# Patient Record
Sex: FEMALE | Race: OTHER | Employment: PART TIME
[De-identification: names, ages, dates, MRNs, and addresses within clinical notes are randomized per-mention and may not be internally consistent; named-entity substitution may affect disease eponyms.]

---

## 2017-01-04 ENCOUNTER — ROUTINE PRENATAL (OUTPATIENT)
Dept: OBGYN | Facility: CLINIC | Age: 32
End: 2017-01-04

## 2017-01-04 VITALS
SYSTOLIC BLOOD PRESSURE: 138 MMHG | DIASTOLIC BLOOD PRESSURE: 91 MMHG | BODY MASS INDEX: 52.75 KG/M2 | WEIGHT: 293 LBS | HEART RATE: 106 BPM

## 2017-01-04 DIAGNOSIS — O09.91 HIGH-RISK PREGNANCY, FIRST TRIMESTER: Primary | ICD-10-CM

## 2017-01-04 PROCEDURE — 99212 OFFICE O/P EST SF 10 MIN: CPT | Performed by: OBSTETRICS & GYNECOLOGY

## 2017-01-10 ENCOUNTER — PATIENT MESSAGE (OUTPATIENT)
Dept: OBGYN | Facility: CLINIC | Age: 32
End: 2017-01-10

## 2017-01-11 ENCOUNTER — ROUTINE PRENATAL (OUTPATIENT)
Dept: OBGYN | Facility: CLINIC | Age: 32
End: 2017-01-11

## 2017-01-11 VITALS — BODY MASS INDEX: 53.25 KG/M2 | WEIGHT: 293 LBS

## 2017-01-11 DIAGNOSIS — O09.93 HIGH-RISK PREGNANCY, THIRD TRIMESTER: Primary | Chronic | ICD-10-CM

## 2017-01-11 PROCEDURE — 99212 OFFICE O/P EST SF 10 MIN: CPT | Performed by: OBSTETRICS & GYNECOLOGY

## 2017-01-13 ENCOUNTER — ROUTINE PRENATAL (OUTPATIENT)
Dept: PERINATAL CARE | Facility: CLINIC | Age: 32
End: 2017-01-13

## 2017-01-13 VITALS
DIASTOLIC BLOOD PRESSURE: 76 MMHG | WEIGHT: 293 LBS | RESPIRATION RATE: 20 BRPM | HEART RATE: 108 BPM | SYSTOLIC BLOOD PRESSURE: 128 MMHG | HEIGHT: 65 IN | TEMPERATURE: 97.6 F | BODY MASS INDEX: 48.82 KG/M2

## 2017-01-13 DIAGNOSIS — O99.810 ABNORMAL GLUCOSE TOLERANCE TEST (GTT) DURING PREGNANCY, ANTEPARTUM: ICD-10-CM

## 2017-01-13 DIAGNOSIS — O99.213 OBESITY COMPLICATING PREGNANCY, THIRD TRIMESTER: ICD-10-CM

## 2017-01-13 DIAGNOSIS — Z3A.36 36 WEEKS GESTATION OF PREGNANCY: ICD-10-CM

## 2017-01-13 DIAGNOSIS — Z03.74 SUSPECTED PROBLEM WITH FETAL GROWTH NOT FOUND: ICD-10-CM

## 2017-01-13 DIAGNOSIS — O09.293 HISTORY OF MACROSOMIA IN INFANT IN PRIOR PREGNANCY, CURRENTLY PREGNANT, THIRD TRIMESTER: ICD-10-CM

## 2017-01-13 DIAGNOSIS — O36.63X0 LARGE FOR DATES COMPLICATING PREGNANCY, ANTEPARTUM, THIRD TRIMESTER, NOT APPLICABLE OR UNSPECIFIED FETUS: Primary | ICD-10-CM

## 2017-01-13 DIAGNOSIS — Z13.89 ENCOUNTER FOR ROUTINE SCREENING FOR MALFORMATION USING ULTRASONICS: ICD-10-CM

## 2017-01-13 DIAGNOSIS — Z36.4 ANTENATAL SCREENING FOR FETAL GROWTH RETARDATION USING ULTRASONICS: ICD-10-CM

## 2017-01-13 PROBLEM — O09.299 HISTORY OF MACROSOMIA IN INFANT IN PRIOR PREGNANCY, CURRENTLY PREGNANT: Status: ACTIVE | Noted: 2017-01-13

## 2017-01-13 PROBLEM — O36.60X0 LARGE FOR DATES COMPLICATING PREGNANCY, ANTEPARTUM: Status: ACTIVE | Noted: 2017-01-13

## 2017-01-13 PROCEDURE — 76819 FETAL BIOPHYS PROFIL W/O NST: CPT | Performed by: OBSTETRICS & GYNECOLOGY

## 2017-01-13 PROCEDURE — 76816 OB US FOLLOW-UP PER FETUS: CPT | Performed by: OBSTETRICS & GYNECOLOGY

## 2017-01-17 ENCOUNTER — ROUTINE PRENATAL (OUTPATIENT)
Dept: OBGYN | Facility: CLINIC | Age: 32
End: 2017-01-17

## 2017-01-17 VITALS — BODY MASS INDEX: 53.58 KG/M2 | DIASTOLIC BLOOD PRESSURE: 80 MMHG | WEIGHT: 293 LBS | SYSTOLIC BLOOD PRESSURE: 125 MMHG

## 2017-01-17 DIAGNOSIS — O09.93 HIGH-RISK PREGNANCY, THIRD TRIMESTER: Primary | ICD-10-CM

## 2017-01-17 PROCEDURE — 99212 OFFICE O/P EST SF 10 MIN: CPT | Performed by: OBSTETRICS & GYNECOLOGY

## 2017-01-24 ENCOUNTER — TELEPHONE (OUTPATIENT)
Dept: OBGYN | Facility: CLINIC | Age: 32
End: 2017-01-24

## 2017-01-24 ENCOUNTER — ROUTINE PRENATAL (OUTPATIENT)
Dept: OBGYN | Facility: CLINIC | Age: 32
End: 2017-01-24

## 2017-01-24 VITALS
DIASTOLIC BLOOD PRESSURE: 80 MMHG | HEART RATE: 105 BPM | WEIGHT: 293 LBS | BODY MASS INDEX: 53.58 KG/M2 | SYSTOLIC BLOOD PRESSURE: 123 MMHG

## 2017-01-24 DIAGNOSIS — O09.93 HIGH-RISK PREGNANCY, THIRD TRIMESTER: Primary | ICD-10-CM

## 2017-01-24 PROCEDURE — 99212 OFFICE O/P EST SF 10 MIN: CPT | Performed by: OBSTETRICS & GYNECOLOGY

## 2017-01-27 PROBLEM — Z98.891 S/P C-SECTION: Status: ACTIVE | Noted: 2017-01-27

## 2017-02-02 ENCOUNTER — POSTPARTUM VISIT (OUTPATIENT)
Dept: OBGYN | Facility: CLINIC | Age: 32
End: 2017-02-02

## 2017-02-02 VITALS
TEMPERATURE: 98 F | RESPIRATION RATE: 15 BRPM | WEIGHT: 293 LBS | HEART RATE: 80 BPM | DIASTOLIC BLOOD PRESSURE: 82 MMHG | BODY MASS INDEX: 48.82 KG/M2 | SYSTOLIC BLOOD PRESSURE: 133 MMHG | OXYGEN SATURATION: 98 % | HEIGHT: 65 IN

## 2017-02-02 PROCEDURE — 99024 POSTOP FOLLOW-UP VISIT: CPT | Performed by: OBSTETRICS & GYNECOLOGY

## 2017-03-13 ENCOUNTER — PATIENT MESSAGE (OUTPATIENT)
Dept: OBGYN CLINIC | Age: 32
End: 2017-03-13

## 2017-03-15 ENCOUNTER — PATIENT MESSAGE (OUTPATIENT)
Dept: OBGYN CLINIC | Age: 32
End: 2017-03-15

## 2017-03-28 ENCOUNTER — ROUTINE PRENATAL (OUTPATIENT)
Dept: OBGYN CLINIC | Age: 32
End: 2017-03-28
Payer: MEDICARE

## 2017-03-28 VITALS
HEART RATE: 81 BPM | WEIGHT: 293 LBS | SYSTOLIC BLOOD PRESSURE: 126 MMHG | BODY MASS INDEX: 49.26 KG/M2 | DIASTOLIC BLOOD PRESSURE: 82 MMHG

## 2017-03-28 PROBLEM — Z03.74 SUSPECTED PROBLEM WITH FETAL GROWTH NOT FOUND: Status: RESOLVED | Noted: 2017-01-13 | Resolved: 2017-03-28

## 2017-03-28 PROBLEM — O36.60X0 LARGE FOR DATES COMPLICATING PREGNANCY, ANTEPARTUM: Status: RESOLVED | Noted: 2017-01-13 | Resolved: 2017-03-28

## 2017-04-21 ENCOUNTER — PATIENT MESSAGE (OUTPATIENT)
Dept: OBGYN | Facility: CLINIC | Age: 32
End: 2017-04-21

## 2017-04-24 ENCOUNTER — TELEPHONE (OUTPATIENT)
Dept: OBGYN CLINIC | Age: 32
End: 2017-04-24

## 2017-04-24 DIAGNOSIS — B37.0 THRUSH: Primary | ICD-10-CM

## 2017-04-24 RX ORDER — NYSTATIN 100000 U/G
CREAM TOPICAL
Qty: 1 TUBE | Refills: 1 | Status: SHIPPED | OUTPATIENT
Start: 2017-04-24 | End: 2017-05-10

## 2017-05-10 ENCOUNTER — OFFICE VISIT (OUTPATIENT)
Dept: OBGYN CLINIC | Age: 32
End: 2017-05-10
Payer: MEDICARE

## 2017-05-10 VITALS
WEIGHT: 293 LBS | DIASTOLIC BLOOD PRESSURE: 88 MMHG | BODY MASS INDEX: 50.09 KG/M2 | HEART RATE: 78 BPM | SYSTOLIC BLOOD PRESSURE: 132 MMHG

## 2017-05-10 DIAGNOSIS — L76.82 INCISIONAL PAIN: Primary | ICD-10-CM

## 2017-05-10 PROCEDURE — 99212 OFFICE O/P EST SF 10 MIN: CPT | Performed by: OBSTETRICS & GYNECOLOGY

## 2017-07-05 DIAGNOSIS — N92.6 MISSED MENSES: ICD-10-CM

## 2017-10-05 ENCOUNTER — HOSPITAL ENCOUNTER (OUTPATIENT)
Age: 32
Discharge: HOME | End: 2017-10-05
Payer: MEDICARE

## 2017-10-05 LAB
ABSOLUTE EOS #: 0.2 K/UL (ref 0–0.4)
ABSOLUTE LYMPH #: 2 K/UL (ref 1–4.8)
ABSOLUTE MONO #: 0.4 K/UL (ref 0.2–0.8)
ALBUMIN SERPL-MCNC: 4.1 G/DL (ref 3.5–5.2)
ALBUMIN/GLOBULIN RATIO: NORMAL (ref 1–2.5)
ALP BLD-CCNC: 96 U/L (ref 35–104)
ALT SERPL-CCNC: 10 U/L (ref 5–33)
ANION GAP SERPL CALCULATED.3IONS-SCNC: 11 MMOL/L (ref 9–17)
AST SERPL-CCNC: 13 U/L
BASOPHILS # BLD: 1 %
BASOPHILS ABSOLUTE: 0 K/UL (ref 0–0.2)
BILIRUB SERPL-MCNC: 0.42 MG/DL (ref 0.3–1.2)
BILIRUBIN DIRECT: 0.09 MG/DL
BUN BLDV-MCNC: 11 MG/DL (ref 6–20)
BUN/CREAT BLD: 19 (ref 9–20)
CALCIUM SERPL-MCNC: 9.2 MG/DL (ref 8.6–10.4)
CHLORIDE BLD-SCNC: 101 MMOL/L (ref 98–107)
CHOLESTEROL, FASTING: 175 MG/DL
CHOLESTEROL/HDL RATIO: 2.5
CO2: 29 MMOL/L (ref 20–31)
CREAT SERPL-MCNC: 0.59 MG/DL (ref 0.5–0.9)
DIFFERENTIAL TYPE: NORMAL
EOSINOPHILS RELATIVE PERCENT: 2 %
GFR AFRICAN AMERICAN: >60 ML/MIN
GFR NON-AFRICAN AMERICAN: >60 ML/MIN
GFR SERPL CREATININE-BSD FRML MDRD: NORMAL ML/MIN/{1.73_M2}
GFR SERPL CREATININE-BSD FRML MDRD: NORMAL ML/MIN/{1.73_M2}
GLUCOSE FASTING: 92 MG/DL (ref 70–99)
HCG QUANTITATIVE: <1 IU/L
HCT VFR BLD CALC: 43 % (ref 36–46)
HDLC SERPL-MCNC: 69 MG/DL
HEMOGLOBIN: 14.5 G/DL (ref 12–16)
LDL CHOLESTEROL: 91 MG/DL (ref 0–130)
LYMPHOCYTES # BLD: 28 %
MAGNESIUM: 1.9 MG/DL (ref 1.6–2.6)
MCH RBC QN AUTO: 29.1 PG (ref 26–34)
MCHC RBC AUTO-ENTMCNC: 33.8 G/DL (ref 31–37)
MCV RBC AUTO: 86.1 FL (ref 80–100)
MONOCYTES # BLD: 5 %
PDW BLD-RTO: 12.9 % (ref 11.5–14.5)
PLATELET # BLD: 330 K/UL (ref 130–400)
PLATELET ESTIMATE: NORMAL
PMV BLD AUTO: 6.8 FL (ref 6–12)
POTASSIUM SERPL-SCNC: 4.3 MMOL/L (ref 3.7–5.3)
RBC # BLD: 4.99 M/UL (ref 4–5.2)
RBC # BLD: NORMAL 10*6/UL
SEG NEUTROPHILS: 64 %
SEGMENTED NEUTROPHILS ABSOLUTE COUNT: 4.6 K/UL (ref 1.8–7.7)
SODIUM BLD-SCNC: 141 MMOL/L (ref 135–144)
T3 FREE: 2.97 PG/ML (ref 2.02–4.43)
THYROXINE, FREE: 1.1 NG/DL (ref 0.93–1.7)
TOTAL CK: 81 U/L (ref 26–192)
TOTAL PROTEIN: 7.2 G/DL (ref 6.4–8.3)
TRIGLYCERIDE, FASTING: 73 MG/DL
TSH SERPL DL<=0.05 MIU/L-ACNC: 2.5 MIU/L (ref 0.3–5)
VLDLC SERPL CALC-MCNC: NORMAL MG/DL (ref 1–30)
WBC # BLD: 7.2 K/UL (ref 3.5–11)
WBC # BLD: NORMAL 10*3/UL

## 2017-10-05 PROCEDURE — 84443 ASSAY THYROID STIM HORMONE: CPT

## 2017-10-05 PROCEDURE — 84702 CHORIONIC GONADOTROPIN TEST: CPT

## 2017-10-05 PROCEDURE — 84481 FREE ASSAY (FT-3): CPT

## 2017-10-05 PROCEDURE — 84439 ASSAY OF FREE THYROXINE: CPT

## 2017-10-05 PROCEDURE — 86376 MICROSOMAL ANTIBODY EACH: CPT

## 2017-10-05 PROCEDURE — 80061 LIPID PANEL: CPT

## 2017-10-05 PROCEDURE — 36415 COLL VENOUS BLD VENIPUNCTURE: CPT

## 2017-10-05 PROCEDURE — 80053 COMPREHEN METABOLIC PANEL: CPT

## 2017-10-05 PROCEDURE — 82248 BILIRUBIN DIRECT: CPT

## 2017-10-05 PROCEDURE — 86800 THYROGLOBULIN ANTIBODY: CPT

## 2017-10-05 PROCEDURE — 85025 COMPLETE CBC W/AUTO DIFF WBC: CPT

## 2017-10-05 PROCEDURE — 82550 ASSAY OF CK (CPK): CPT

## 2017-10-05 PROCEDURE — 83735 ASSAY OF MAGNESIUM: CPT

## 2017-10-06 LAB
THYROGLOBULIN AB: <20 IU/ML (ref 0–40)
THYROID PEROXIDASE (TPO) AB: <10 IU/ML (ref 0–35)

## 2017-11-01 ENCOUNTER — OFFICE VISIT (OUTPATIENT)
Dept: OBGYN CLINIC | Age: 32
End: 2017-11-01
Payer: MEDICARE

## 2017-11-01 VITALS
RESPIRATION RATE: 16 BRPM | DIASTOLIC BLOOD PRESSURE: 75 MMHG | SYSTOLIC BLOOD PRESSURE: 115 MMHG | WEIGHT: 293 LBS | HEIGHT: 65 IN | HEART RATE: 74 BPM | BODY MASS INDEX: 48.82 KG/M2 | OXYGEN SATURATION: 100 %

## 2017-11-01 DIAGNOSIS — Z30.2 ENCOUNTER FOR STERILIZATION: Primary | ICD-10-CM

## 2017-11-01 PROCEDURE — G8484 FLU IMMUNIZE NO ADMIN: HCPCS | Performed by: OBSTETRICS & GYNECOLOGY

## 2017-11-01 PROCEDURE — 1036F TOBACCO NON-USER: CPT | Performed by: OBSTETRICS & GYNECOLOGY

## 2017-11-01 PROCEDURE — G8427 DOCREV CUR MEDS BY ELIG CLIN: HCPCS | Performed by: OBSTETRICS & GYNECOLOGY

## 2017-11-01 PROCEDURE — G8417 CALC BMI ABV UP PARAM F/U: HCPCS | Performed by: OBSTETRICS & GYNECOLOGY

## 2017-11-01 PROCEDURE — 99212 OFFICE O/P EST SF 10 MIN: CPT | Performed by: OBSTETRICS & GYNECOLOGY

## 2017-11-01 RX ORDER — NORGESTIMATE AND ETHINYL ESTRADIOL 0.25-0.035
1 KIT ORAL DAILY
Qty: 1 PACKET | Refills: 6 | Status: SHIPPED | OUTPATIENT
Start: 2017-11-01 | End: 2020-09-15

## 2017-12-07 ENCOUNTER — TELEPHONE (OUTPATIENT)
Dept: OBGYN CLINIC | Age: 32
End: 2017-12-07

## 2018-10-12 ENCOUNTER — APPOINTMENT (OUTPATIENT)
Dept: GENERAL RADIOLOGY | Age: 33
End: 2018-10-12
Payer: MEDICAID

## 2018-10-12 ENCOUNTER — HOSPITAL ENCOUNTER (EMERGENCY)
Age: 33
Discharge: HOME | End: 2018-10-13
Attending: EMERGENCY MEDICINE
Payer: MEDICAID

## 2018-10-12 VITALS
HEART RATE: 94 BPM | SYSTOLIC BLOOD PRESSURE: 126 MMHG | WEIGHT: 293 LBS | RESPIRATION RATE: 25 BRPM | BODY MASS INDEX: 45.99 KG/M2 | OXYGEN SATURATION: 100 % | TEMPERATURE: 98.1 F | HEIGHT: 67 IN | DIASTOLIC BLOOD PRESSURE: 98 MMHG

## 2018-10-12 DIAGNOSIS — R07.9 CHEST PAIN, UNSPECIFIED TYPE: ICD-10-CM

## 2018-10-12 DIAGNOSIS — R42 LIGHTHEADEDNESS: Primary | ICD-10-CM

## 2018-10-12 LAB
ABSOLUTE EOS #: 0.19 K/UL (ref 0–0.44)
ABSOLUTE IMMATURE GRANULOCYTE: 0.03 K/UL (ref 0–0.3)
ABSOLUTE LYMPH #: 2.32 K/UL (ref 1.1–3.7)
ABSOLUTE MONO #: 0.59 K/UL (ref 0.1–1.2)
ANION GAP SERPL CALCULATED.3IONS-SCNC: 10 MMOL/L (ref 9–17)
BASOPHILS # BLD: 0 % (ref 0–2)
BASOPHILS ABSOLUTE: 0.04 K/UL (ref 0–0.2)
BUN BLDV-MCNC: 12 MG/DL (ref 6–20)
BUN/CREAT BLD: ABNORMAL (ref 9–20)
CALCIUM SERPL-MCNC: 9.1 MG/DL (ref 8.6–10.4)
CHLORIDE BLD-SCNC: 96 MMOL/L (ref 98–107)
CO2: 29 MMOL/L (ref 20–31)
CREAT SERPL-MCNC: 0.66 MG/DL (ref 0.5–0.9)
DIFFERENTIAL TYPE: ABNORMAL
EKG ATRIAL RATE: 84 BPM
EKG P AXIS: 46 DEGREES
EKG P-R INTERVAL: 164 MS
EKG Q-T INTERVAL: 354 MS
EKG QRS DURATION: 76 MS
EKG QTC CALCULATION (BAZETT): 418 MS
EKG R AXIS: 9 DEGREES
EKG T AXIS: 12 DEGREES
EKG VENTRICULAR RATE: 84 BPM
EOSINOPHILS RELATIVE PERCENT: 2 % (ref 1–4)
GFR AFRICAN AMERICAN: >60 ML/MIN
GFR NON-AFRICAN AMERICAN: >60 ML/MIN
GFR SERPL CREATININE-BSD FRML MDRD: ABNORMAL ML/MIN/{1.73_M2}
GFR SERPL CREATININE-BSD FRML MDRD: ABNORMAL ML/MIN/{1.73_M2}
GLUCOSE BLD-MCNC: 106 MG/DL (ref 70–99)
HCG QUALITATIVE: NEGATIVE
HCT VFR BLD CALC: 42.5 % (ref 36.3–47.1)
HEMOGLOBIN: 14 G/DL (ref 11.9–15.1)
IMMATURE GRANULOCYTES: 0 %
LYMPHOCYTES # BLD: 25 % (ref 24–43)
MCH RBC QN AUTO: 28.1 PG (ref 25.2–33.5)
MCHC RBC AUTO-ENTMCNC: 32.9 G/DL (ref 28.4–34.8)
MCV RBC AUTO: 85.2 FL (ref 82.6–102.9)
MONOCYTES # BLD: 6 % (ref 3–12)
NRBC AUTOMATED: 0 PER 100 WBC
PDW BLD-RTO: 12.1 % (ref 11.8–14.4)
PLATELET # BLD: 344 K/UL (ref 138–453)
PLATELET ESTIMATE: ABNORMAL
PMV BLD AUTO: 8.9 FL (ref 8.1–13.5)
POC TROPONIN I: 0.01 NG/ML (ref 0–0.1)
POC TROPONIN INTERP: NORMAL
POTASSIUM SERPL-SCNC: 4.8 MMOL/L (ref 3.7–5.3)
RBC # BLD: 4.99 M/UL (ref 3.95–5.11)
RBC # BLD: ABNORMAL 10*6/UL
SEG NEUTROPHILS: 67 % (ref 36–65)
SEGMENTED NEUTROPHILS ABSOLUTE COUNT: 6.19 K/UL (ref 1.5–8.1)
SODIUM BLD-SCNC: 135 MMOL/L (ref 135–144)
WBC # BLD: 9.4 K/UL (ref 3.5–11.3)
WBC # BLD: ABNORMAL 10*3/UL

## 2018-10-12 PROCEDURE — 85025 COMPLETE CBC W/AUTO DIFF WBC: CPT

## 2018-10-12 PROCEDURE — 99285 EMERGENCY DEPT VISIT HI MDM: CPT

## 2018-10-12 PROCEDURE — 2580000003 HC RX 258: Performed by: EMERGENCY MEDICINE

## 2018-10-12 PROCEDURE — 93005 ELECTROCARDIOGRAM TRACING: CPT

## 2018-10-12 PROCEDURE — 80048 BASIC METABOLIC PNL TOTAL CA: CPT

## 2018-10-12 PROCEDURE — 84703 CHORIONIC GONADOTROPIN ASSAY: CPT

## 2018-10-12 PROCEDURE — 84484 ASSAY OF TROPONIN QUANT: CPT

## 2018-10-12 PROCEDURE — 71046 X-RAY EXAM CHEST 2 VIEWS: CPT

## 2018-10-12 RX ORDER — 0.9 % SODIUM CHLORIDE 0.9 %
1000 INTRAVENOUS SOLUTION INTRAVENOUS ONCE
Status: COMPLETED | OUTPATIENT
Start: 2018-10-12 | End: 2018-10-13

## 2018-10-12 RX ORDER — ASPIRIN 81 MG/1
324 TABLET, CHEWABLE ORAL ONCE
Status: DISCONTINUED | OUTPATIENT
Start: 2018-10-12 | End: 2018-10-13 | Stop reason: HOSPADM

## 2018-10-12 RX ORDER — CLONAZEPAM 0.5 MG/1
0.5 TABLET ORAL 2 TIMES DAILY PRN
COMMUNITY
End: 2021-01-29

## 2018-10-12 RX ADMIN — SODIUM CHLORIDE 1000 ML: 9 INJECTION, SOLUTION INTRAVENOUS at 22:43

## 2018-10-12 ASSESSMENT — HEART SCORE: ECG: 0

## 2018-10-13 LAB
POC TROPONIN I: 0 NG/ML (ref 0–0.1)
POC TROPONIN INTERP: NORMAL

## 2018-10-13 PROCEDURE — 84484 ASSAY OF TROPONIN QUANT: CPT

## 2018-10-13 ASSESSMENT — ENCOUNTER SYMPTOMS
COLOR CHANGE: 0
ABDOMINAL PAIN: 0
SORE THROAT: 0
VOMITING: 0
NAUSEA: 0
EYE PAIN: 0
COUGH: 0
RHINORRHEA: 0
SHORTNESS OF BREATH: 0

## 2018-10-13 NOTE — ED PROVIDER NOTES
ACS, PE. Patient is a heart score of 2 and his negative PERC criteria. Given her presentation, we'll plan to perform a syncopal/cardiac workup. We'll give her some IV fluids and reassess. RADIOLOGY:  Xr Chest Standard (2 Vw)    Result Date: 10/12/2018  EXAMINATION: TWO VIEWS OF THE CHEST 10/12/2018 9:59 pm COMPARISON: 02/26/2015 HISTORY: ORDERING SYSTEM PROVIDED HISTORY: chest pain TECHNOLOGIST PROVIDED HISTORY: chest pain FINDINGS: Cardiomediastinal contour is within normal limits. No focal consolidation. No significant pleural effusion. 1. No acute cardiopulmonary disease. EKG  Normal sinus rhythm with a ventricular rate of 84 bpm.  Axis is normal.  Intervals within normal limits. No acute ST segment changes. There is T-wave inversion in lead 3, but all these findings are consistent with an EKG Done on 10/26/16. Impression: Nonspecific EKG. All EKG's are interpreted by the Emergency Department Physician who either signs or Co-signs this chart in the absence of a cardiologist.    EMERGENCY DEPARTMENT COURSE:  Upon reassessment, patient stated that she felt much improved. Denies any lightheadedness or chest discomfort. She is also updated on lab work and imaging studies. Patient was advised to follow-up with her PCP. Discussed that if symptoms persist or worsen, she should return to the ED. Patient demonstrated her understanding and is agreeable with the plan. She is stable for discharge. PROCEDURES:  None    CONSULTS:  None    CRITICAL CARE:  None    FINAL IMPRESSION      1. Lightheadedness    2.  Chest pain, unspecified type          DISPOSITION / PLAN     DISPOSITION Decision To Discharge 10/13/2018 12:23:19 AM      PATIENT REFERRED TO:  Yousif Owens MD  [address] [place] [address]  [phone]    Schedule an appointment as soon as possible for a visit   As needed, If symptoms worsen      DISCHARGE MEDICATIONS:  Discharge Medication List as of 10/13/2018 12:23 AM Shira Hardy MD  Emergency Medicine Resident    (Please note that portions of thisnote were completed with a voice recognition program.  Efforts were made to edit the dictations but occasionally words are mis-transcribed.)       Shira Hardy MD  Resident  10/13/18 [id number]

## 2018-10-13 NOTE — ED NOTES
Pt and family updated on POC, fluids started, denies further needs.      Juan Mcmillan, GARRISON  10/12/18 [id number]

## 2019-12-25 ENCOUNTER — HOSPITAL ENCOUNTER (EMERGENCY)
Age: 34
Discharge: HOME | End: 2019-12-25
Attending: EMERGENCY MEDICINE
Payer: MEDICARE

## 2019-12-25 ENCOUNTER — APPOINTMENT (OUTPATIENT)
Dept: GENERAL RADIOLOGY | Age: 34
End: 2019-12-25
Payer: MEDICARE

## 2019-12-25 VITALS
WEIGHT: 293 LBS | DIASTOLIC BLOOD PRESSURE: 86 MMHG | OXYGEN SATURATION: 100 % | BODY MASS INDEX: 53.25 KG/M2 | TEMPERATURE: 97.8 F | SYSTOLIC BLOOD PRESSURE: 123 MMHG | RESPIRATION RATE: 17 BRPM | HEART RATE: 79 BPM

## 2019-12-25 DIAGNOSIS — R00.2 PALPITATIONS: Primary | ICD-10-CM

## 2019-12-25 LAB
ABSOLUTE EOS #: 0.14 K/UL (ref 0–0.44)
ABSOLUTE IMMATURE GRANULOCYTE: <0.03 K/UL (ref 0–0.3)
ABSOLUTE LYMPH #: 3.41 K/UL (ref 1.1–3.7)
ABSOLUTE MONO #: 0.58 K/UL (ref 0.1–1.2)
ANION GAP SERPL CALCULATED.3IONS-SCNC: 13 MMOL/L (ref 9–17)
BASOPHILS # BLD: 1 % (ref 0–2)
BASOPHILS ABSOLUTE: 0.06 K/UL (ref 0–0.2)
BUN BLDV-MCNC: 11 MG/DL (ref 6–20)
BUN/CREAT BLD: ABNORMAL (ref 9–20)
CALCIUM IONIZED: 1.15 MMOL/L (ref 1.13–1.33)
CALCIUM SERPL-MCNC: 9.2 MG/DL (ref 8.6–10.4)
CHLORIDE BLD-SCNC: 98 MMOL/L (ref 98–107)
CO2: 24 MMOL/L (ref 20–31)
CREAT SERPL-MCNC: 0.6 MG/DL (ref 0.5–0.9)
D-DIMER QUANTITATIVE: 0.31 MG/L FEU
DIFFERENTIAL TYPE: ABNORMAL
EOSINOPHILS RELATIVE PERCENT: 1 % (ref 1–4)
GFR AFRICAN AMERICAN: >60 ML/MIN
GFR NON-AFRICAN AMERICAN: >60 ML/MIN
GFR SERPL CREATININE-BSD FRML MDRD: ABNORMAL ML/MIN/{1.73_M2}
GFR SERPL CREATININE-BSD FRML MDRD: ABNORMAL ML/MIN/{1.73_M2}
GLUCOSE BLD-MCNC: 115 MG/DL (ref 70–99)
HCG QUALITATIVE: NEGATIVE
HCT VFR BLD CALC: 45.4 % (ref 36.3–47.1)
HEMOGLOBIN: 14.5 G/DL (ref 11.9–15.1)
IMMATURE GRANULOCYTES: 0 %
LYMPHOCYTES # BLD: 35 % (ref 24–43)
MAGNESIUM: 2 MG/DL (ref 1.6–2.6)
MCH RBC QN AUTO: 27.9 PG (ref 25.2–33.5)
MCHC RBC AUTO-ENTMCNC: 31.9 G/DL (ref 28.4–34.8)
MCV RBC AUTO: 87.5 FL (ref 82.6–102.9)
MONOCYTES # BLD: 6 % (ref 3–12)
NRBC AUTOMATED: 0 PER 100 WBC
PDW BLD-RTO: 12.5 % (ref 11.8–14.4)
PLATELET # BLD: 351 K/UL (ref 138–453)
PLATELET ESTIMATE: ABNORMAL
PMV BLD AUTO: 8.9 FL (ref 8.1–13.5)
POTASSIUM SERPL-SCNC: 3.8 MMOL/L (ref 3.7–5.3)
RBC # BLD: 5.19 M/UL (ref 3.95–5.11)
RBC # BLD: ABNORMAL 10*6/UL
SEG NEUTROPHILS: 57 % (ref 36–65)
SEGMENTED NEUTROPHILS ABSOLUTE COUNT: 5.61 K/UL (ref 1.5–8.1)
SODIUM BLD-SCNC: 135 MMOL/L (ref 135–144)
THYROXINE, FREE: 1.32 NG/DL (ref 0.93–1.7)
TROPONIN INTERP: NORMAL
TROPONIN T: NORMAL NG/ML
TROPONIN, HIGH SENSITIVITY: <6 NG/L (ref 0–14)
TSH SERPL DL<=0.05 MIU/L-ACNC: 5.17 MIU/L (ref 0.3–5)
WBC # BLD: 9.8 K/UL (ref 3.5–11.3)
WBC # BLD: ABNORMAL 10*3/UL

## 2019-12-25 PROCEDURE — 99285 EMERGENCY DEPT VISIT HI MDM: CPT

## 2019-12-25 PROCEDURE — 84439 ASSAY OF FREE THYROXINE: CPT

## 2019-12-25 PROCEDURE — 71045 X-RAY EXAM CHEST 1 VIEW: CPT

## 2019-12-25 PROCEDURE — 80048 BASIC METABOLIC PNL TOTAL CA: CPT

## 2019-12-25 PROCEDURE — 85025 COMPLETE CBC W/AUTO DIFF WBC: CPT

## 2019-12-25 PROCEDURE — 85379 FIBRIN DEGRADATION QUANT: CPT

## 2019-12-25 PROCEDURE — 84484 ASSAY OF TROPONIN QUANT: CPT

## 2019-12-25 PROCEDURE — 84443 ASSAY THYROID STIM HORMONE: CPT

## 2019-12-25 PROCEDURE — 93005 ELECTROCARDIOGRAM TRACING: CPT | Performed by: STUDENT IN AN ORGANIZED HEALTH CARE EDUCATION/TRAINING PROGRAM

## 2019-12-25 PROCEDURE — 82330 ASSAY OF CALCIUM: CPT

## 2019-12-25 PROCEDURE — 84703 CHORIONIC GONADOTROPIN ASSAY: CPT

## 2019-12-25 PROCEDURE — 2580000003 HC RX 258: Performed by: STUDENT IN AN ORGANIZED HEALTH CARE EDUCATION/TRAINING PROGRAM

## 2019-12-25 PROCEDURE — 83735 ASSAY OF MAGNESIUM: CPT

## 2019-12-25 RX ORDER — 0.9 % SODIUM CHLORIDE 0.9 %
1000 INTRAVENOUS SOLUTION INTRAVENOUS ONCE
Status: COMPLETED | OUTPATIENT
Start: 2019-12-25 | End: 2019-12-25

## 2019-12-25 RX ADMIN — SODIUM CHLORIDE 1000 ML: 9 INJECTION, SOLUTION INTRAVENOUS at 03:02

## 2019-12-26 LAB
EKG ATRIAL RATE: 89 BPM
EKG P AXIS: 26 DEGREES
EKG P-R INTERVAL: 156 MS
EKG Q-T INTERVAL: 368 MS
EKG QRS DURATION: 78 MS
EKG QTC CALCULATION (BAZETT): 447 MS
EKG R AXIS: -11 DEGREES
EKG T AXIS: -5 DEGREES
EKG VENTRICULAR RATE: 89 BPM

## 2019-12-26 PROCEDURE — 93010 ELECTROCARDIOGRAM REPORT: CPT | Performed by: INTERNAL MEDICINE

## 2020-09-11 ENCOUNTER — APPOINTMENT (OUTPATIENT)
Dept: CT IMAGING | Age: 35
End: 2020-09-11
Payer: COMMERCIAL

## 2020-09-11 ENCOUNTER — HOSPITAL ENCOUNTER (EMERGENCY)
Age: 35
Discharge: HOME | End: 2020-09-11
Attending: EMERGENCY MEDICINE
Payer: COMMERCIAL

## 2020-09-11 VITALS
SYSTOLIC BLOOD PRESSURE: 143 MMHG | WEIGHT: 293 LBS | HEIGHT: 65 IN | RESPIRATION RATE: 12 BRPM | OXYGEN SATURATION: 98 % | HEART RATE: 80 BPM | DIASTOLIC BLOOD PRESSURE: 95 MMHG | BODY MASS INDEX: 48.82 KG/M2 | TEMPERATURE: 97.3 F

## 2020-09-11 LAB
ANION GAP SERPL CALCULATED.3IONS-SCNC: 9 MMOL/L (ref 9–17)
BUN BLDV-MCNC: 10 MG/DL (ref 6–20)
BUN/CREAT BLD: ABNORMAL (ref 9–20)
CALCIUM SERPL-MCNC: 9.3 MG/DL (ref 8.6–10.4)
CHLORIDE BLD-SCNC: 99 MMOL/L (ref 98–107)
CHP ED QC CHECK: YES
CO2: 28 MMOL/L (ref 20–31)
CREAT SERPL-MCNC: 0.65 MG/DL (ref 0.5–0.9)
GFR AFRICAN AMERICAN: >60 ML/MIN
GFR NON-AFRICAN AMERICAN: >60 ML/MIN
GFR SERPL CREATININE-BSD FRML MDRD: ABNORMAL ML/MIN/{1.73_M2}
GFR SERPL CREATININE-BSD FRML MDRD: ABNORMAL ML/MIN/{1.73_M2}
GLUCOSE BLD-MCNC: 114 MG/DL (ref 70–99)
HCG, PREGNANCY URINE (POC): NEGATIVE
POTASSIUM SERPL-SCNC: 4 MMOL/L (ref 3.7–5.3)
PREGNANCY TEST URINE, POC: NEGATIVE
SODIUM BLD-SCNC: 136 MMOL/L (ref 135–144)

## 2020-09-11 PROCEDURE — 6360000002 HC RX W HCPCS: Performed by: GENERAL PRACTICE

## 2020-09-11 PROCEDURE — 81025 URINE PREGNANCY TEST: CPT

## 2020-09-11 PROCEDURE — 6370000000 HC RX 637 (ALT 250 FOR IP): Performed by: GENERAL PRACTICE

## 2020-09-11 PROCEDURE — 70496 CT ANGIOGRAPHY HEAD: CPT

## 2020-09-11 PROCEDURE — 96372 THER/PROPH/DIAG INJ SC/IM: CPT

## 2020-09-11 PROCEDURE — 80048 BASIC METABOLIC PNL TOTAL CA: CPT

## 2020-09-11 PROCEDURE — 6360000004 HC RX CONTRAST MEDICATION: Performed by: GENERAL PRACTICE

## 2020-09-11 PROCEDURE — 70450 CT HEAD/BRAIN W/O DYE: CPT

## 2020-09-11 PROCEDURE — 99284 EMERGENCY DEPT VISIT MOD MDM: CPT

## 2020-09-11 RX ORDER — PROCHLORPERAZINE EDISYLATE 5 MG/ML
10 INJECTION INTRAMUSCULAR; INTRAVENOUS ONCE
Status: DISCONTINUED | OUTPATIENT
Start: 2020-09-11 | End: 2020-09-11

## 2020-09-11 RX ORDER — KETOROLAC TROMETHAMINE 30 MG/ML
30 INJECTION, SOLUTION INTRAMUSCULAR; INTRAVENOUS ONCE
Status: DISCONTINUED | OUTPATIENT
Start: 2020-09-11 | End: 2020-09-11

## 2020-09-11 RX ORDER — PROCHLORPERAZINE MALEATE 10 MG
10 TABLET ORAL ONCE
Status: COMPLETED | OUTPATIENT
Start: 2020-09-11 | End: 2020-09-11

## 2020-09-11 RX ORDER — ORPHENADRINE CITRATE 30 MG/ML
60 INJECTION INTRAMUSCULAR; INTRAVENOUS ONCE
Status: COMPLETED | OUTPATIENT
Start: 2020-09-11 | End: 2020-09-11

## 2020-09-11 RX ORDER — DIPHENHYDRAMINE HCL 25 MG
25 TABLET ORAL ONCE
Status: COMPLETED | OUTPATIENT
Start: 2020-09-11 | End: 2020-09-11

## 2020-09-11 RX ADMIN — ORPHENADRINE CITRATE 60 MG: 30 INJECTION INTRAMUSCULAR; INTRAVENOUS at 10:56

## 2020-09-11 RX ADMIN — IOHEXOL 90 ML: 350 INJECTION, SOLUTION INTRAVENOUS at 12:28

## 2020-09-11 RX ADMIN — PROCHLORPERAZINE MALEATE 10 MG: 10 TABLET ORAL at 11:06

## 2020-09-11 RX ADMIN — DIPHENHYDRAMINE HCL 25 MG: 25 TABLET ORAL at 11:06

## 2020-09-11 ASSESSMENT — ENCOUNTER SYMPTOMS
ABDOMINAL PAIN: 0
EYE REDNESS: 0
COUGH: 0
PHOTOPHOBIA: 0
NAUSEA: 0
EYE DISCHARGE: 0
EYE PAIN: 0
BACK PAIN: 0
SHORTNESS OF BREATH: 0
EYE ITCHING: 0
VOMITING: 0

## 2020-09-11 ASSESSMENT — VISUAL ACUITY
OS: 20/40
OD: 20/40
OU: 20/30

## 2020-09-11 ASSESSMENT — PAIN SCALES - GENERAL: PAINLEVEL_OUTOF10: 5

## 2020-09-11 ASSESSMENT — PAIN DESCRIPTION - PAIN TYPE: TYPE: ACUTE PAIN

## 2020-09-11 ASSESSMENT — PAIN DESCRIPTION - LOCATION: LOCATION: HEAD;NECK

## 2020-09-11 NOTE — ED NOTES
Bed: 07  Expected date:   Expected time:   Means of arrival:   Comments:     Ana Cristina Lynch RN  09/11/20 [id number]

## 2020-09-11 NOTE — ED NOTES
Wanda Aquino, EMT-P at bedside for IV placement and lab work     Barry Pike, [address] [place] [address]  09/11/20 [id number]

## 2020-09-11 NOTE — ED PROVIDER NOTES
[facility]  Emergency Department  Faculty Attestation     I performed a history and physical examination of the patient and discussed management with the resident. I reviewed the residents note and agree with the documented findings and plan of care. Any areas of disagreement are noted on the chart. I was personally present for the key portions of any procedures. I have documented in the chart those procedures where I was not present during the key portions. I have reviewed the emergency nurses triage note. I agree with the chief complaint, past medical history, past surgical history, allergies, medications, social and family history as documented unless otherwise noted below. For Physician Assistant/ Nurse Practitioner cases/documentation I have personally evaluated this patient and have completed at least one if not all key elements of the E/M (history, physical exam, and MDM). Additional findings are as noted. Primary Care Physician:  Leah Conrad MD    Screenings:  [unfilled]    CHIEF COMPLAINT       Chief Complaint   Patient presents with    Headache     c/o posterior headache since last night stating radiates down her neck w/ intermittent blurred vision to right eye, states she took tylenol 1500mg 2 hours pta       RECENT VITALS:   Temp: 97.3 °F (36.3 °C),  Pulse: 80, Resp: 12, BP: (!) 143/95    LABS:  Labs Reviewed   POCT URINE PREGNANCY       Radiology  No orders to display       Attending Physician Additional  Notes    Patient has severe unusual left-sided headache that starts behind her left ear/occiput and radiates up to the top of her head. The pain makes her nauseated but there is no vomiting. She has blurring of her right eye vision. No scintillating scotoma or flashes. No curtain. No stiff neck other than she has difficulty turning to the left. No fever chills or sweats. No covert symptoms. No family history of aneurysm or migraine.   No history of concussion or strangulation. She is not known to be pregnant at this time. No focal strokelike deficits, other than dizziness when she moves, felt she was off balance with walking. On exam she is uncomfortable but nontoxic slightly hypertensive afebrile vital signs otherwise normal, GCS 15. Normal speech mentation memory pupils extraocular movements. Cranial nerves intact. Normal motor strength. Normal gait. Normal finger-nose movements. Neck is supple. There is small lymphadenopathy in the left suboccipital chain. There is no occipital nerve tenderness. Impression is unusual headache, consider vertebral artery dissection. Plan is IV access, pregnancy test, Compazine Benadryl, CT brain, CT angiogram.            Patricia .  Romain Michel MD, [facility]  Attending Emergency  Physician               Michaeline Sicard, MD  09/11/20 [phone]

## 2020-09-11 NOTE — ED PROVIDER NOTES
[facility] ED  Emergency Department Encounter  EmergencyMedicine Resident     Pt Name:Teagan Cohen  MRN: [id number]  [place] 1985  Date of evaluation: 20  PCP:  [place] MD    CHIEF COMPLAINT       Chief Complaint   Patient presents with    Headache     c/o posterior headache since last night stating radiates down her neck w/ intermittent blurred vision to right eye, states she took tylenol 1500mg 2 hours pta       HISTORY OF PRESENT ILLNESS  (Location/Symptom, Timing/Onset, Context/Setting, Quality, Duration, Modifying Factors, Severity.)      Jay Suárez is a 37 y.o. female who presents with left-sided posterior headache that starts in her neck and radiates into the occipital region of the left side of her head, does have occasional blurry vision in left eye with the symptoms. Patient states she has seen her primary care provider for this and was given muscle relaxers which did not help. Patient states she did take a Tylenol 2 hours prior to arrival and her symptoms have resolved. Patient states she feels like she can feel a knot in the neck and it hurts when she touches it. Patient states her symptoms are sometimes worse after waking up, but normally get better throughout the day. Denies any history of migraines, photophobia, bright flashing lights or any current vision changes. Patient states she does have occasional nausea associated with the pain but denies any vomiting, chest pain, shortness of breath, abdominal pain. Patient's last menstrual period was 3 weeks ago which is normal for her. PAST MEDICAL / SURGICAL / SOCIAL / FAMILY HISTORY      has a past medical history of Abnormal Pap smear, Allergic rhinitis, Anxiety, BMI 50.0-59.9, adult ([address] Depression, Obesity, and Ureteral stone with hydronephrosis. has a past surgical history that includes  section (2007, 16); Dilation and curettage of uterus;  Cystocopy (16); other surgical history (Left, 16); and Lithotripsy. Social History     Socioeconomic History    Marital status:      Spouse name: Not on file    Number of children: Not on file    Years of education: Not on file    Highest education level: Not on file   Occupational History    Not on file   Social Needs    Financial resource strain: Not on file    Food insecurity     Worry: Not on file     Inability: Not on file    Transportation needs     Medical: Not on file     Non-medical: Not on file   Tobacco Use    Smoking status: Former Smoker     Last attempt to quit: 2013     Years since quittin.7    Smokeless tobacco: Never Used   Substance and Sexual Activity    Alcohol use: No    Drug use: No    Sexual activity: Yes     Partners: Male   Lifestyle    Physical activity     Days per week: Not on file     Minutes per session: Not on file    Stress: Not on file   Relationships    Social connections     Talks on phone: Not on file     Gets together: Not on file     Attends [employer]ist service: Not on file     Active member of club or organization: Not on file     Attends meetings of clubs or organizations: Not on file     Relationship status: Not on file    Intimate partner violence     Fear of current or ex partner: Not on file     Emotionally abused: Not on file     Physically abused: Not on file     Forced sexual activity: Not on file   Other Topics Concern    Not on file   Social History Narrative    Not on file       Family History   Problem Relation Age of Onset    Diabetes Mother     Thyroid Disease Mother     Hypertension Mother     Depression Mother     Hypertension Paternal Grandmother     Diabetes Paternal Grandmother        Allergies:  Patient has no known allergies. Home Medications:  Prior to Admission medications    Medication Sig Start Date End Date Taking? Authorizing Provider   clonazePAM (KLONOPIN) 0.5 MG tablet Take 0.5 mg by mouth 2 times daily as needed. Santos Olguin Historical Provider, MD   norgestimate-ethinyl estradiol (ORTHO-CYCLEN, 28,) 0.25-35 MG-MCG per tablet Take 1 tablet by mouth daily 11/1/17   Jessica Comment, MD   Prenatal Vit-DSS-Fe Fum-FA (PNV FE FUM/DOCUSATE/FOLIC ACID) 35-7 MG TABS Take 1 tablet by mouth daily 7/6/17   Velvet Soliman, APRN - CNM   ibuprofen (ADVIL;MOTRIN) 800 MG tablet Take 1 tablet by mouth every 8 hours as needed for Pain 1/29/17   Brandon Fess, DO       REVIEW OF SYSTEMS    (2-9 systems for level 4, 10 or more for level 5)      Review of Systems   Constitutional: Negative for activity change, appetite change, chills, fatigue and fever. HENT: Negative for ear pain. Eyes: Negative for photophobia, pain, discharge, redness and itching. Respiratory: Negative for cough and shortness of breath. Cardiovascular: Negative for chest pain. Gastrointestinal: Negative for abdominal pain, nausea and vomiting. Genitourinary: Negative for menstrual problem, pelvic pain, vaginal bleeding and vaginal discharge. Musculoskeletal: Positive for neck pain. Negative for back pain and neck stiffness. Skin: Negative for rash and wound. Neurological: Positive for headaches. Negative for dizziness, seizures, syncope, facial asymmetry and numbness. Psychiatric/Behavioral: Negative for confusion. The patient is not nervous/anxious. PHYSICAL EXAM   (up to 7 for level 4, 8 or more for level 5)      INITIAL VITALS:   BP (!) 143/95   Pulse 80   Temp 97.3 °F (36.3 °C) (Oral)   Resp 12   Ht 5' 5\" (1.651 m)   Wt (!) 330 lb (149.7 kg)   LMP 07/28/2020   SpO2 98%   BMI 54.91 kg/m²     Physical Exam  Constitutional:       General: She is not in acute distress. Appearance: She is obese. She is not ill-appearing, toxic-appearing or diaphoretic. HENT:      Head: Normocephalic. Mouth/Throat:      Pharynx: No oropharyngeal exudate or posterior oropharyngeal erythema. Eyes:      General:         Right eye: No discharge.          Left eye: No discharge. Extraocular Movements: Extraocular movements intact. Pupils: Pupils are equal, round, and reactive to light. Neck:      Comments: Patient has firm mobile tender lesion noted in the muscle belly of the posterior aspect of the SCM on the left, this causes pain to shoot from her neck to her head when pressed upon. Muscle spasm left greater than right SCM. Cardiovascular:      Rate and Rhythm: Normal rate. Pulmonary:      Comments: Breathing comfortably room air, symmetrical chest rise, speaking in full sentences  Skin:     General: Skin is warm. Neurological:      General: No focal deficit present. Mental Status: She is alert and oriented to person, place, and time. Cranial Nerves: No cranial nerve deficit. Sensory: No sensory deficit. Motor: No weakness. Gait: Gait normal.   Psychiatric:         Mood and Affect: Mood normal.         Behavior: Behavior normal.         Thought Content:  Thought content normal.         DIFFERENTIAL  DIAGNOSIS     PLAN (LABS / IMAGING / EKG):  Orders Placed This Encounter   Procedures    CT HEAD WO CONTRAST    CTA HEAD NECK W CONTRAST    BASIC METABOLIC PANEL    Visual acuity screening    POCT urine pregnancy    POCT urine pregnancy       MEDICATIONS ORDERED:  Orders Placed This Encounter   Medications    orphenadrine (NORFLEX) injection 60 mg    DISCONTD: ketorolac (TORADOL) injection 30 mg    DISCONTD: prochlorperazine (COMPAZINE) injection 10 mg    diphenhydrAMINE (BENADRYL) tablet 25 mg    prochlorperazine (COMPAZINE) tablet 10 mg    iohexol (OMNIPAQUE 350) solution 90 mL       DDX: Tension headache, migraine headache, trigger point, cluster headache, giant cell or redness    DIAGNOSTIC RESULTS / [facility]   :  Results for orders placed or performed during the hospital encounter of 68/40/05   BASIC METABOLIC PANEL   Result Value Ref Range    Glucose 114 (H) 70 - 99 mg/dL    BUN 10 6 - 20 mg/dL CREATININE 0.65 0.50 - 0.90 mg/dL    Bun/Cre Ratio NOT REPORTED 9 - 20    Calcium 9.3 8.6 - 10.4 mg/dL    Sodium 136 135 - 144 mmol/L    Potassium 4.0 3.7 - 5.3 mmol/L    Chloride 99 98 - 107 mmol/L    CO2 28 20 - 31 mmol/L    Anion Gap 9 9 - 17 mmol/L    GFR Non-African American >60 >60 mL/min    GFR African American >60 >60 mL/min    GFR Comment          GFR Staging NOT REPORTED    POCT urine pregnancy   Result Value Ref Range    Preg Test, Ur Negative     QC OK? yes    POCT urine pregnancy   Result Value Ref Range    HCG, Pregnancy Urine (POC) NEGATIVE NEGATIVE           RADIOLOGY:  Narrative    EXAMINATION:    CTA OF THE HEAD AND NECK WITH CONTRAST 9/11/2020 12:27 pm:         TECHNIQUE:    CTA of the head and neck was performed with the administration of intravenous    contrast. Multiplanar reformatted images are provided for review.  MIP images    are provided for review. Stenosis of the internal carotid arteries measured    using NASCET criteria.  Dose modulation, iterative reconstruction, and/or    weight based adjustment of the mA/kV was utilized to reduce the radiation    dose to as low as reasonably achievable.         COMPARISON:    None.         HISTORY:    ORDERING SYSTEM PROVIDED HISTORY: HA change in vision    TECHNOLOGIST PROVIDED HISTORY:    HA change in vision    Reason for Exam: HA change in vision    Acuity: Acute    Type of Exam: Initial         FINDINGS:         CTA NECK:         Evaluation is partially limited due to beam attenuation due to patient body    habitus.         AORTIC ARCH/ARCH VESSELS: There appears to be a normal 3 vessel arch    configuration.  No focal stenosis seen of the innominate or subclavian    arteries.         CAROTID ARTERIES: No convincing focal stenosis or dissection of the common    carotid arteries.  There is no flow limiting stenosis of the internal carotid    arteries by NASCET criteria.  No convincing dissection of the internal    carotid arteries.

## 2020-09-15 ENCOUNTER — HOSPITAL ENCOUNTER (OUTPATIENT)
Age: 35
Setting detail: SPECIMEN
Discharge: HOME | End: 2020-09-15
Payer: COMMERCIAL

## 2020-09-15 ENCOUNTER — OFFICE VISIT (OUTPATIENT)
Dept: OBGYN CLINIC | Age: 35
End: 2020-09-15
Payer: COMMERCIAL

## 2020-09-15 VITALS
BODY MASS INDEX: 48.82 KG/M2 | TEMPERATURE: 97.1 F | DIASTOLIC BLOOD PRESSURE: 90 MMHG | WEIGHT: 293 LBS | SYSTOLIC BLOOD PRESSURE: 142 MMHG | HEIGHT: 65 IN | HEART RATE: 93 BPM

## 2020-09-15 PROCEDURE — 99395 PREV VISIT EST AGE 18-39: CPT | Performed by: OBSTETRICS & GYNECOLOGY

## 2020-09-15 ASSESSMENT — ENCOUNTER SYMPTOMS
BACK PAIN: 0
COUGH: 0
SHORTNESS OF BREATH: 0
ABDOMINAL PAIN: 0

## 2020-09-15 NOTE — PROGRESS NOTES
[facility]  [facility] - [address]  Dept: [phone]    Chief complaint:   Chief Complaint   Patient presents with    Gynecologic Exam     Last pap 5/19/15 WNL -HPV       History Present Illness: Mi Machuca is a 36 yo female who presents for her annual exam and to establish care. She has not been to the gynecologist in almost 3 years. She is sexually active with her  and denies any dyspareunia or vaginal discharge. She does have some heavy periods and would like to be on something hormonal to make her periods lighter. She denies any bowel or bladder issues. Current Medications (OTC/Herbal):   Current Outpatient Medications   Medication Sig Dispense Refill    clonazePAM (KLONOPIN) 0.5 MG tablet Take 0.5 mg by mouth 2 times daily as needed. Pradeep Devlin norgestimate-ethinyl estradiol (ORTHO-CYCLEN, 28,) 0.25-35 MG-MCG per tablet Take 1 tablet by mouth daily 1 packet 6    Prenatal Vit-DSS-Fe Fum-FA (PNV FE FUM/DOCUSATE/FOLIC ACID) 53-9 MG TABS Take 1 tablet by mouth daily 30 tablet 5    ibuprofen (ADVIL;MOTRIN) 800 MG tablet Take 1 tablet by mouth every 8 hours as needed for Pain 30 tablet 0     No current facility-administered medications for this visit.       Allergies: No Known Allergies  Past Medical History:   Past Medical History:   Diagnosis Date    Abnormal Pap smear 08    Allergic rhinitis     Anxiety     meds in the past    BMI 50.0-59.9, adult ([address]     Depression     Obesity     Ureteral stone with hydronephrosis 2016     Past Surgical History:   Past Surgical History:   Procedure Laterality Date     SECTION  2007, 14    CYSTOSCOPY  16    DILATION AND CURETTAGE OF UTERUS      LITHOTRIPSY      OTHER SURGICAL HISTORY Left 16    ureteral stent placement     Obstetric History:   5  Para 3  Gynecologic History: LMP 2020   Menarche 15  Duration 7 d    Interval q  30 d Tampons/Pads in a day: 6-9  Last Pap: 5/19/15, normal with neg HPV       Any history of abnormal paps yes    PriorColpo/Biopsy      Last Mammogram n/a  Contraception: condoms  Complications: none  STDs: HPV  Psychosocial History: Occupation:   Stay at home mom   Caffeine No    At risk for depression Yes    Abuse:   No  Seatbelt:   Yes  Exercise:  No    Social History     Socioeconomic History    Marital status:      Spouse name: Not on file    Number of children: Not on file    Years of education: Not on file    Highest education level: Not on file   Occupational History    Not on file   Social Needs    Financial resource strain: Not on file    Food insecurity     Worry: Not on file     Inability: Not on file    Transportation needs     Medical: Not on file     Non-medical: Not on file   Tobacco Use    Smoking status: Former Smoker     Last attempt to quit: 2013     Years since quittin.7    Smokeless tobacco: Never Used   Substance and Sexual Activity    Alcohol use: No    Drug use: No    Sexual activity: Yes     Partners: Male   Lifestyle    Physical activity     Days per week: Not on file     Minutes per session: Not on file    Stress: Not on file   Relationships    Social connections     Talks on phone: Not on file     Gets together: Not on file     Attends Latter day service: Not on file     Active member of club or organization: Not on file     Attends meetings of clubs or organizations: Not on file     Relationship status: Not on file    Intimate partner violence     Fear of current or ex partner: Not on file     Emotionally abused: Not on file     Physically abused: Not on file     Forced sexual activity: Not on file   Other Topics Concern    Not on file   Social History Narrative    Not on file       Family History   Problem Relation Age of Onset    Diabetes Mother     Thyroid Disease Mother     Hypertension Mother     Depression Mother     Hypertension Paternal Grandmother     Diabetes Paternal Grandmother        Review of Systems:   Review of Systems   Constitutional: Negative for chills and fever. HENT: Negative for congestion. Respiratory: Negative for cough and shortness of breath. Cardiovascular: Negative for chest pain and palpitations. Gastrointestinal: Negative for abdominal pain. Genitourinary: Negative for dyspareunia and vaginal discharge. Musculoskeletal: Negative for back pain. Neurological: Negative for dizziness and light-headedness. Psychiatric/Behavioral: The patient is not nervous/anxious. Physical exam:  vitals:  Height   5  ft    5 in,  Weight    361 lbs,   143/100 BP  Gen: alert, no apparent distress  HEENT:No pathologic skin lesions noted,NC/AT,PERRL, normal midline nontender thyroid   Lung Exam: Clear to auscultation in all fields bilaterally, without wheezes,rales or rhonchi. Cardiac Exam: Normal sinus rhythm andrate, without murmurs, rubs or gallops appreciated. Breast Exam: Symmetric without pathological skin changes, nontender without discrete suspicious masses palpated, supraclavicular or axillary adenopathy or nipple discharge noted. Abdominal Exam: Nontender to deep palpation without organomegaly, masses or CVAT appreciated, BS positive. No spinal deformation or tenderness. External Genitalia: Normal development without vulvar,vaginal or cervical lesions noted. Normal vaginal discharge, uterus anterior, 4-6 weeks without CMT. Adnexa nontender without abnormal masses bilaterally. Rectal Exam: Omitted. Extremities: Nontender without clubbing, cyanosis or edema. F.R.O.M. Neurologic Exam: Grossly intact without noted sensorimotor deficits and oriented x 3. Assessment/Plan:   Unremarkable annual Gyn exam.    Cervical Cytology Evaluation begins at 24years old. If Negative Cytology, Follow-up screening per current guidelines. Mammograms every 1year. If 37 yo and last mammogram was negative.   Colonoscopy screening reviewed as well as onset for bone density testing. Birth control and barrier recommendations discussed. STD counseling and prevention reviewed. Routine health maintenance per patients PCP.   Pt to follow up for Mirena IUD insertion    Black Cornejo MD  [address]

## 2020-09-18 LAB
HPV SAMPLE: NORMAL
HPV, GENOTYPE 16: NOT DETECTED
HPV, GENOTYPE 18: NOT DETECTED
HPV, HIGH RISK OTHER: NOT DETECTED
HPV, INTERPRETATION: NORMAL
SPECIMEN DESCRIPTION: NORMAL

## 2020-09-24 LAB — CYTOLOGY REPORT: NORMAL

## 2021-01-29 ENCOUNTER — HOSPITAL ENCOUNTER (OUTPATIENT)
Age: 36
Setting detail: SPECIMEN
Discharge: HOME | End: 2021-01-29
Payer: COMMERCIAL

## 2021-01-29 ENCOUNTER — OFFICE VISIT (OUTPATIENT)
Dept: FAMILY MEDICINE CLINIC | Age: 36
End: 2021-01-29
Payer: COMMERCIAL

## 2021-01-29 VITALS
WEIGHT: 293 LBS | HEART RATE: 95 BPM | HEIGHT: 66 IN | BODY MASS INDEX: 47.09 KG/M2 | OXYGEN SATURATION: 97 % | TEMPERATURE: 98.5 F

## 2021-01-29 DIAGNOSIS — B96.89 ACUTE BACTERIAL SINUSITIS: Primary | ICD-10-CM

## 2021-01-29 DIAGNOSIS — Z20.822 SUSPECTED COVID-19 VIRUS INFECTION: ICD-10-CM

## 2021-01-29 DIAGNOSIS — J01.90 ACUTE BACTERIAL SINUSITIS: Primary | ICD-10-CM

## 2021-01-29 PROCEDURE — G8417 CALC BMI ABV UP PARAM F/U: HCPCS | Performed by: NURSE PRACTITIONER

## 2021-01-29 PROCEDURE — G8484 FLU IMMUNIZE NO ADMIN: HCPCS | Performed by: NURSE PRACTITIONER

## 2021-01-29 PROCEDURE — 99204 OFFICE O/P NEW MOD 45 MIN: CPT | Performed by: NURSE PRACTITIONER

## 2021-01-29 PROCEDURE — 1036F TOBACCO NON-USER: CPT | Performed by: NURSE PRACTITIONER

## 2021-01-29 PROCEDURE — G8427 DOCREV CUR MEDS BY ELIG CLIN: HCPCS | Performed by: NURSE PRACTITIONER

## 2021-01-29 RX ORDER — AMOXICILLIN 875 MG/1
875 TABLET, COATED ORAL 2 TIMES DAILY
Qty: 20 TABLET | Refills: 0 | Status: SHIPPED | OUTPATIENT
Start: 2021-01-29 | End: 2021-02-08

## 2021-01-29 RX ORDER — FLUTICASONE PROPIONATE 50 MCG
2 SPRAY, SUSPENSION (ML) NASAL DAILY
Qty: 1 BOTTLE | Refills: 0 | Status: SHIPPED | OUTPATIENT
Start: 2021-01-29

## 2021-01-29 ASSESSMENT — ENCOUNTER SYMPTOMS
SINUS PRESSURE: 1
SWOLLEN GLANDS: 0
SORE THROAT: 0
COUGH: 1
HOARSE VOICE: 0
SHORTNESS OF BREATH: 0

## 2021-01-29 ASSESSMENT — PATIENT HEALTH QUESTIONNAIRE - PHQ9
SUM OF ALL RESPONSES TO PHQ QUESTIONS 1-9: 0
2. FEELING DOWN, DEPRESSED OR HOPELESS: 0

## 2021-01-29 NOTE — PATIENT INSTRUCTIONS
Follow up with family doctor in 1 week as needed. Return immediately if worse, new symptoms develop, symptoms persist or have any questions or concerns. Push fluids, keep hydrated  Cool mist humidifier bedside  Continue all medications as prescribed  May alternate tylenol/motrin over the counter for pain or fever, take per package instructions. The COVID-19 test that was done today can take 1-6 days for results. Until then you should assume you have this disease and adhere to home isolation as described below. When we get the test results back, one of the following readings will be obtained. 1. A positive test means you have the virus. 2.  An inconclusive test means it wasn't sure if you have the virus or not. An inconclusive test result is treated as a positive result and recommendations  are the same as a positive test result. We may ask you to repeat this test in this circumstance. 3.  A negative test means you probably do not have the virus, but it is not conclusive. Prevention steps for People with positive or inconclusive test results or suspected  COVID-19 (including persons under investigation) who do not need to be hospitalized  and   People with confirmed COVID-19 who were hospitalized and determined to be medically stable to go home    You can be around others after:    10 days since symptoms first appeared and  24 hours with no fever without the use of fever-reducing medications and  Other symptoms of COVID-19 are improving*  *Loss of taste and smell may persist for weeks or months after recovery and need not delay the end of isolation    Most people do not require testing to decide when they can be around others; however, if your healthcare provider recommends testing, they will let you know when you can resume being around others based on your test results. Note that these recommendations do not apply to persons with severe COVID-19 or with severely weakened immune systems (immunocompromised). These persons should follow the guidance below for I was severely ill with COVID-19 or have a severely weakened immune system (immunocompromised) due to a health condition or medication. When can I be around others?     [email or web address]    Contacts who are NOT healthcare providers or first responders and are asymptomatic (no fever,  cough, shortness of breath, or difficulty breathing) should self-quarantine for 14 days from the last  date of exposure to confirmed COVID-19. Your healthcare provider and public health staff will evaluate whether you can be cared for at home. If it is determined that you do not need to be hospitalized and can be isolated at home, you will be monitored by staff from your health department. You should follow the prevention steps below until a healthcare provider or local or state health department says you can return to your normal activities. Stay home except to get medical care  People who are mildly ill with COVID-19 are able to isolate at home during their illness. You should restrict activities outside your home, except for getting medical care. Do not go to work, school, or public areas. Avoid using public transportation, ride-sharing, or taxis. Separate yourself from other people and animals in your home  People: As much as possible, you should stay in a specific room and away from other people in your home. Also, you should use a separate bathroom, if available. Animals: You should restrict contact with pets and other animals while you are sick with COVID-19, just like you would around other people. When possible, have another member of your household care for your animals while you are sick. If you are sick with COVID-19, avoid contact with your pet, including petting, snuggling, being kissed or licked, and sharing food. If you must care for your pet or be around animals while you are sick, wash your hands before and after you interact with pets and wear a facemask. Call ahead before visiting your doctor  If you have a medical appointment, call the healthcare provider and tell them that you have or may have COVID-19. This will help the healthcare providers office take steps to keep other people from getting infected or exposed. Wear a facemask  You should wear a facemask when you are around other people (e.g., sharing a room or vehicle) or pets and before you enter a healthcare providers office. If you are not able to wear a facemask (for example, because it causes trouble breathing), then people who live with you should not stay in the same room with you; they should also wear a facemask if they enter your room. Cover your coughs and sneezes  Cover your mouth and nose with a tissue when you cough or sneeze. Throw used tissues in a lined trash can. Immediately wash your hands with soap and water for at least 20 seconds or, if soap and water are not available, clean your hands with an alcohol-based hand  that contains at least 60% alcohol. Clean your hands often  Wash your hands often with soap and water for at least 20 seconds, especially after blowing your nose, coughing, or sneezing; going to the bathroom; and before eating or preparing food. If soap and water are not readily available, use an alcohol-based hand  with at least 60% alcohol, covering all surfaces of your hands and rubbing them together until they feel dry. Soap and water are the best option if hands are visibly dirty. Avoid touching your eyes, nose, and mouth with unwashed hands. Avoid sharing personal household items  You should not share dishes, drinking glasses, cups, eating utensils, towels, or bedding with other people or pets in your home. After using these items, they should be washed thoroughly with soap and water. Clean all high-touch surfaces everyday  High touch surfaces include counters, tabletops, doorknobs, bathroom fixtures, toilets, phones, keyboards, tablets, and bedside tables. Also, clean any surfaces that may have blood, stool, or body fluids on them. Use a household cleaning spray or wipe, according to the label instructions. Labels contain instructions for safe and effective use of the cleaning product including precautions you should take when applying the product, such as wearing gloves and making sure you have good ventilation during use of the product. Monitor your symptoms  Seek prompt medical attention if your illness is worsening (e.g., difficulty breathing). Before seeking care, call your healthcare provider and tell them that you have, or are being evaluated for, COVID-19. Put on a facemask before you enter the facility. These steps will help the healthcare providers office to keep other people in the office or waiting room from getting infected or exposed. Persons who are placed under active monitoring or facilitated self-monitoring should follow instructions provided by their local health department or occupational health professionals, as appropriate. When working with your local health department check their available hours. If you have a medical emergency and need to call 911, notify the dispatch personnel that you have, or are being evaluated for COVID-19. If possible, put on a facemask before emergency medical services arrive.   Discontinuing home isolation Patients with confirmed COVID-19 should remain under home isolation precautions until the risk of secondary transmission to others is thought to be low. The decision to discontinue home isolation precautions should be made on a case-by-case basis, in consultation with your physician and the health department. Please do NOT make this decision on your own. If your results of the COVID-19 test is NEGATIVE -     The patient may stop isolation, in consultation with your health care provider, typically when: Your healthcare provider has determined that the cause of the illness is NOT COVID-19 and approves your return to work. OR  Ten (10) days have passed since onset of symptoms AND one day (24 hours) have passed with no fever without taking medication (like Tylenol) to reduce fever,  respiratory symptoms have resolved and you have been evaluated by your health care provider. Please follow up with your physician for evaluation about this. The following websites are the best places for up to date information on this fluid situation. [email or web address]      [place] Thony- keeps someone who might have been exposed to the virus away from others  Isolation  keeps someone who is infected with the virus away from others, even in their homes    Scenario 1    Your patient has close contact with an individual who has COVID-19. Your patient will not have further contact. Plan  Your patient is quarantined from the last day of contact for 14 days    Scenario 2   Your patient has lives with someone who has COVID-19 but can avoid further contact. Plan  Your patient is quarantined for 14 days starting when the person with COVID-19 begins home isolation. Scenario 3    Your patient is under quarantine and has additional close contact with someone else who has COVID-19. Plan  Your patient must restart quarantine from the last COVID-19 exposure.     Scenario 4 Your patient lives with someone who has COVID-19 and cannot avoid close contact from them. Plan  Your patient is quarantined while the other person is isolating and for 14 days after covid  19 person meets the criteria to end home isolation. Patient Education        Sinusitis: Care Instructions  Your Care Instructions     Sinusitis is an infection of the lining of the sinus cavities in your head. Sinusitis often follows a cold. It causes pain and pressure in your head and face. In most cases, sinusitis gets better on its own in 1 to 2 weeks. But some mild symptoms may last for several weeks. Sometimes antibiotics are needed. Follow-up care is a key part of your treatment and safety. Be sure to make and go to all appointments, and call your doctor if you are having problems. It's also a good idea to know your test results and keep a list of the medicines you take. How can you care for yourself at home? · Take an over-the-counter pain medicine, such as acetaminophen (Tylenol), ibuprofen (Advil, Motrin), or naproxen (Aleve). Read and follow all instructions on the label. · If the doctor prescribed antibiotics, take them as directed. Do not stop taking them just because you feel better. You need to take the full course of antibiotics. · Be careful when taking over-the-counter cold or flu medicines and Tylenol at the same time. Many of these medicines have acetaminophen, which is Tylenol. Read the labels to make sure that you are not taking more than the recommended dose. Too much acetaminophen (Tylenol) can be harmful. · Breathe warm, moist air from a steamy shower, a hot bath, or a sink filled with hot water. Avoid cold, dry air. Using a humidifier in your home may help. Follow the directions for cleaning the machine. · Use saline (saltwater) nasal washes to help keep your nasal passages open and wash out mucus and bacteria. You can buy saline nose drops at a grocery store or drugstore. Or you can make your own at home by adding 1 teaspoon of salt and 1 teaspoon of baking soda to 2 cups of distilled water. If you make your own, fill a bulb syringe with the solution, insert the tip into your nostril, and squeeze gently. Jessi Dandy your nose. · Put a hot, wet towel or a warm gel pack on your face 3 or 4 times a day for 5 to 10 minutes each time. · Try a decongestant nasal spray like oxymetazoline (Afrin). Do not use it for more than 3 days in a row. Using it for more than 3 days can make your congestion worse. When should you call for help? Call your doctor now or seek immediate medical care if:    · You have new or worse swelling or redness in your face or around your eyes.     · You have a new or higher fever. Watch closely for changes in your health, and be sure to contact your doctor if:    · You have new or worse facial pain.     · The mucus from your nose becomes thicker (like pus) or has new blood in it.     · You are not getting better as expected. Where can you learn more? Go to https://[employer].Citizen.VC. org and sign in to your [employer] account. Enter B459 in the [facility] box to learn more about \"Sinusitis: Care Instructions. \"     If you do not have an account, please click on the \"Sign Up Now\" link. Current as of: April 15, 2020               Content Version: 12.6  © 5026-8196 [employer]. Care instructions adapted under license by [facility] ([facility]). If you have questions about a medical condition or this instruction, always ask your healthcare professional. [address] any warranty or liability for your use of this information.

## 2021-01-29 NOTE — PROGRESS NOTES
[place] [address] [facility]   [address] [place] [address] [address] [facility] [address]  Dept: [phone]  Dept Fax: [phone]    Vasu Bloom is a 37 y.o. female who presents to the urgent care today for her medical conditions/complaints as notedbelow. Vasu Bloom is c/o of Covid Testing (cough, congestion, ear pressure x 5 days )      HPI:     37 yr old female presents for ear pressure (rt > left), np cough and nasal congestion, body aches x5d. Denies fevers, loss taste or smell. NO sob or chest pain. No known covid exposure. Taking vitamin c, antihistamine and ordered some zinc from store today. Thinks she has a sinus infection    Sinusitis  This is a new problem. The current episode started in the past 7 days (x5d). The problem has been gradually worsening since onset. There has been no fever. Her pain is at a severity of 1/10. The pain is mild. Associated symptoms include congestion ( nasal), coughing (dry np), ear pain and sinus pressure. Pertinent negatives include no chills, diaphoresis, headaches, hoarse voice, neck pain, shortness of breath, sneezing, sore throat or swollen glands. Treatments tried: vit c and antihistamine. The treatment provided no relief. Past Medical History:   Diagnosis Date    Abnormal Pap smear 2/12/08    Allergic rhinitis     Anxiety     meds in the past    BMI 50.0-59.9, adult ([address]     Depression     Obesity     Ureteral stone with hydronephrosis 1/21/2016        Current Outpatient Medications   Medication Sig Dispense Refill    amoxicillin (AMOXIL) 875 MG tablet Take 1 tablet by mouth 2 times daily for 10 days 20 tablet 0    fluticasone (FLONASE) 50 MCG/ACT nasal spray 2 sprays by Nasal route daily 1 Bottle 0    Prenatal Vit-DSS-Fe Fum-FA (PNV FE FUM/DOCUSATE/FOLIC ACID) 97-8 MG TABS Take 1 tablet by mouth daily 30 tablet 5     No current facility-administered medications for this visit.       No Known Allergies Subjective:      Review of Systems   Constitutional: Negative for chills and diaphoresis. HENT: Positive for congestion ( nasal), ear pain and sinus pressure. Negative for hoarse voice, sneezing and sore throat. Respiratory: Positive for cough (dry np). Negative for shortness of breath. Musculoskeletal: Negative for neck pain. Neurological: Negative for headaches. All other systems reviewed and are negative. 14 systems reviewed and negative except as listed in HPI. Objective:     Physical Exam  Vitals signs and nursing note reviewed. Constitutional:       General: She is not in acute distress. Appearance: Normal appearance. She is not ill-appearing, toxic-appearing or diaphoretic. HENT:      Head: Normocephalic and atraumatic. Right Ear: Ear canal and external ear normal.      Left Ear: Ear canal and external ear normal.      Ears:      Comments: fern middle ear effusions     Nose: Congestion present. No rhinorrhea. Comments: fern maxillary sinus tenderness  No facial swelling or erythema     Mouth/Throat:      Mouth: Mucous membranes are moist.      Pharynx: No oropharyngeal exudate or posterior oropharyngeal erythema. Comments: + cobblestoning  Uvula midline no edema  Handling oral secretions without difficulty  Eyes:      General: No scleral icterus. Right eye: No discharge. Left eye: No discharge. Extraocular Movements: Extraocular movements intact. Conjunctiva/sclera: Conjunctivae normal.      Pupils: Pupils are equal, round, and reactive to light. Neck:      Musculoskeletal: Neck supple. Cardiovascular:      Rate and Rhythm: Normal rate and regular rhythm. Pulses: Normal pulses. Heart sounds: Normal heart sounds. Pulmonary:      Effort: Pulmonary effort is normal. No respiratory distress. Breath sounds: Normal breath sounds. No stridor. No wheezing, rhonchi or rales.    Abdominal:      General: Bowel sounds are normal.  fluticasone (FLONASE) 50 MCG/ACT nasal spray     Si sprays by Nasal route daily     Dispense:  1 Bottle     Refill:  0         Patient given educational materials - see patient instructions. Discussed use, benefit, and side effects of prescribed medications. All patient questions answered. Pt voicedunderstanding.     Electronically signed by TAYLOR Black CNP on 2021 at 3:36 PM

## 2021-01-30 LAB
SARS-COV-2, RAPID: NORMAL
SARS-COV-2: NORMAL
SARS-COV-2: NOT DETECTED
SOURCE: NORMAL

## 2021-01-31 ENCOUNTER — TELEPHONE (OUTPATIENT)
Dept: PRIMARY CARE CLINIC | Age: 36
End: 2021-01-31

## 2021-08-16 ENCOUNTER — OFFICE VISIT (OUTPATIENT)
Dept: OBGYN CLINIC | Age: 36
End: 2021-08-16
Payer: COMMERCIAL

## 2021-08-16 ENCOUNTER — TELEPHONE (OUTPATIENT)
Dept: OBGYN CLINIC | Age: 36
End: 2021-08-16

## 2021-08-16 VITALS
DIASTOLIC BLOOD PRESSURE: 79 MMHG | SYSTOLIC BLOOD PRESSURE: 121 MMHG | HEART RATE: 81 BPM | BODY MASS INDEX: 60.85 KG/M2 | WEIGHT: 293 LBS | TEMPERATURE: 98.2 F

## 2021-08-16 DIAGNOSIS — R10.2 PELVIC PAIN: Primary | ICD-10-CM

## 2021-08-16 PROCEDURE — 99213 OFFICE O/P EST LOW 20 MIN: CPT | Performed by: OBSTETRICS & GYNECOLOGY

## 2021-08-16 PROCEDURE — G8417 CALC BMI ABV UP PARAM F/U: HCPCS | Performed by: OBSTETRICS & GYNECOLOGY

## 2021-08-16 PROCEDURE — G8427 DOCREV CUR MEDS BY ELIG CLIN: HCPCS | Performed by: OBSTETRICS & GYNECOLOGY

## 2021-08-16 PROCEDURE — 1036F TOBACCO NON-USER: CPT | Performed by: OBSTETRICS & GYNECOLOGY

## 2021-08-16 ASSESSMENT — ENCOUNTER SYMPTOMS
ABDOMINAL PAIN: 0
SHORTNESS OF BREATH: 0
COUGH: 0
BACK PAIN: 0

## 2021-08-16 NOTE — PROGRESS NOTES
[facility]  [facility] - [address] [facility] [address] [facility]  Dept: [phone]  Dept Fax: [phone]    21    Chief Complaint   Patient presents with    Follow-up     discuss options        Aura Carter 31 y.o. has a complaint of pelvic pain. She has had pain and has been on birth control in the past.  She doesn't like how it makes her feel. She says her periods last 5-7 days. Review of Systems   Constitutional: Negative for chills and fever. HENT: Negative for congestion. Respiratory: Negative for cough and shortness of breath. Cardiovascular: Negative for chest pain and palpitations. Gastrointestinal: Negative for abdominal pain. Genitourinary: Negative for dyspareunia, pelvic pain and vaginal discharge. Musculoskeletal: Negative for back pain. Neurological: Negative for dizziness and light-headedness. Psychiatric/Behavioral: The patient is not nervous/anxious. Gynecologic History  Patient's last menstrual period was 08/15/2021 (exact date).   Contraception: condoms  Last Pap: 9/15/20  Results: normal  Last Mammogram: n/a    Obstetric History  : 5  Para: 2  AB: 3    Past Medical History:   Diagnosis Date    Abnormal Pap smear 08    Allergic rhinitis     Anxiety     meds in the past    BMI 50.0-59.9, adult ([facility] [address]     Depression     Obesity     Ureteral stone with hydronephrosis 2016     Past Surgical History:   Procedure Laterality Date     SECTION  2007, 14    CYSTOSCOPY  16    DILATION AND CURETTAGE OF UTERUS      LITHOTRIPSY      OTHER SURGICAL HISTORY Left 16    ureteral stent placement     No Known Allergies  Current Outpatient Medications   Medication Sig Dispense Refill    fluticasone (FLONASE) 50 MCG/ACT nasal spray 2 sprays by Nasal route daily 1 Bottle 0    Prenatal Vit-DSS-Fe Fum-FA (PNV FE FUM/DOCUSATE/FOLIC ACID) 34-4 MG TABS Take 1 tablet by mouth daily (Patient not taking: Reported on 2021) 30 tablet 5     No current facility-administered medications for this visit. Social History     Socioeconomic History    Marital status:      Spouse name: Not on file    Number of children: Not on file    Years of education: Not on file    Highest education level: Not on file   Occupational History    Not on file   Tobacco Use    Smoking status: Former Smoker     Quit date: 2013     Years since quittin.6    Smokeless tobacco: Never Used   Vaping Use    Vaping Use: Never used   Substance and Sexual Activity    Alcohol use: No    Drug use: No    Sexual activity: Yes     Partners: Male   Other Topics Concern    Not on file   Social History Narrative    Not on file     Social Determinants of Health     Financial Resource Strain:     Difficulty of Paying Living Expenses:    Food Insecurity:     Worried About Running Out of Food in the Last Year:     [address] in the Last Year:    Transportation Needs:     Lack of Transportation (Medical):  Lack of Transportation (Non-Medical):    Physical Activity:     Days of Exercise per Week:     Minutes of Exercise per Session:    Stress:     Feeling of Stress :    Social Connections:     Frequency of Communication with Friends and Family:     Frequency of Social Gatherings with Friends and Family:     Attends Anabaptism Services:     Active Member of Clubs or Organizations:     Attends Club or Organization Meetings:     Marital Status:    Intimate Partner Violence:     Fear of Current or Ex-Partner:     Emotionally Abused:     Physically Abused:     Sexually Abused:      Family History   Problem Relation Age of Onset    Diabetes Mother     Thyroid Disease Mother     Hypertension Mother     Depression Mother     Hypertension Paternal Grandmother     Diabetes Paternal Grandmother        Physical exam  Physical Exam  Constitutional:       Appearance: Normal appearance.  She is obese.   HENT:      Head: Normocephalic. Eyes:      Extraocular Movements: Extraocular movements intact. Pulmonary:      Effort: Pulmonary effort is normal.   Neurological:      Mental Status: She is alert and oriented to person, place, and time. Psychiatric:         Mood and Affect: Mood normal.         Behavior: Behavior normal.         Thought Content: Thought content normal.         Judgment: Judgment normal.         Assessment/Plan  1. Pelvic pain  - Discussed options for controlling pelvic pain including hormonal vs surgical options. Pt has tried hormonal options in the past and doesn't like them, so she is opting for a diagnostic laparoscopy. She is not wanting any further pregnancies as well, so is opting for a RRBS also. Discussed at length the risks and benefits of concurrent bilateral salpingectomy with patient's upcoming schedule abdominal or pelvic surgery per recommendation of the Society of Gynecologic Oncology, American College of Obstetricians and Gynecologists as this patient is at above average risk for development of ovarian cancer. Advised patient that the primary benefit of such surgery is a 65% reduction in future risk of ovarian cancer. Patient advised that large scale studies have not demonstrated an increase in estimated blood loss, complications, or operating time but that bleeding, infection, and injury to nearby organs are potential complications with this additional surgery. Finally, patient has been thoroughly counseled regarding the consequence of loss of fertility following this procedure. Patient understands that this loss of fertility can not be reversed and has expressed via verbal and written consent that her wishes are to proceed with the this surgery for the purposes of ovarian cancer reduction.     Will schedule pt for a Dx laparoscopy w/ RRBS at [address] [place] [address]

## 2021-08-19 ENCOUNTER — TELEPHONE (OUTPATIENT)
Dept: OBGYN CLINIC | Age: 36
End: 2021-08-19

## 2021-08-20 NOTE — TELEPHONE ENCOUNTER
LVM letting pt know her surgery was 10/21/21 at [facility] at 10:30am. . Advised PAT apt is 10/7/21 at 945am. Advised to call with any questions and to scheule post op apt.

## 2021-10-20 ENCOUNTER — TELEPHONE (OUTPATIENT)
Dept: OBGYN CLINIC | Age: 36
End: 2021-10-20

## 2022-11-14 PROCEDURE — 99283 EMERGENCY DEPT VISIT LOW MDM: CPT

## 2022-11-15 ENCOUNTER — APPOINTMENT (OUTPATIENT)
Dept: GENERAL RADIOLOGY | Age: 37
End: 2022-11-15
Payer: COMMERCIAL

## 2022-11-15 ENCOUNTER — HOSPITAL ENCOUNTER (EMERGENCY)
Age: 37
Discharge: LEFT BEFORE DISCHARGE | End: 2022-11-15
Attending: STUDENT IN AN ORGANIZED HEALTH CARE EDUCATION/TRAINING PROGRAM
Payer: COMMERCIAL

## 2022-11-15 VITALS
HEART RATE: 104 BPM | HEIGHT: 65 IN | OXYGEN SATURATION: 97 % | DIASTOLIC BLOOD PRESSURE: 83 MMHG | TEMPERATURE: 97.5 F | SYSTOLIC BLOOD PRESSURE: 137 MMHG | WEIGHT: 293 LBS | RESPIRATION RATE: 22 BRPM | BODY MASS INDEX: 48.82 KG/M2

## 2022-11-15 DIAGNOSIS — R05.3 CHRONIC COUGH: Primary | ICD-10-CM

## 2022-11-15 PROCEDURE — 71046 X-RAY EXAM CHEST 2 VIEWS: CPT

## 2022-11-15 PROCEDURE — 6370000000 HC RX 637 (ALT 250 FOR IP): Performed by: STUDENT IN AN ORGANIZED HEALTH CARE EDUCATION/TRAINING PROGRAM

## 2022-11-15 RX ORDER — GUAIFENESIN DEXTROMETHORPHAN HYDROBROMIDE ORAL SOLUTION 10; 100 MG/5ML; MG/5ML
10 SOLUTION ORAL ONCE
Status: COMPLETED | OUTPATIENT
Start: 2022-11-15 | End: 2022-11-15

## 2022-11-15 RX ADMIN — DEXTROMETHORPHAN HYDROBROMIDE, GUAIFENESIN 10 ML: 10; 100 LIQUID ORAL at 00:44

## 2022-11-15 ASSESSMENT — ENCOUNTER SYMPTOMS
EYE DISCHARGE: 0
SORE THROAT: 1
ABDOMINAL PAIN: 0
NAUSEA: 0
COUGH: 1
SHORTNESS OF BREATH: 0
DIARRHEA: 0
EYE REDNESS: 0
VOMITING: 0
RHINORRHEA: 0

## 2022-11-15 NOTE — ED NOTES
Patient states that her  has to work tomorrow. Leaves before completing treatment.      Efren Anthony RN  11/15/22 [id number]

## 2022-11-15 NOTE — ED TRIAGE NOTES
Pt to triage c/o covid/flu like symptoms x 2 weeks. Pt states she is very congested and has been coughing a lot. Pt states she took a home test for covid today and it was negative. No signs of distress. Calm and cooperative.

## 2022-11-15 NOTE — ED PROVIDER NOTES
EMERGENCY DEPARTMENT ENCOUNTER    Pt Name: Mark Campbell  MRN: [id number]  [place] 1985  Date of evaluation: 11/15/22  CHIEF COMPLAINT       Chief Complaint   Patient presents with    Cough     Covid/ flu-like symptoms     HISTORY OF PRESENT ILLNESS   The history is provided by the patient. Cough  Cough characteristics:  Non-productive  Severity:  Moderate  Onset quality:  Gradual  Duration:  2 weeks  Timing:  Constant  Relieved by:  Nothing  Worsened by:  Nothing  Ineffective treatments:  None tried  Associated symptoms: sore throat    Associated symptoms: no chest pain, no chills, no eye discharge, no fever, no myalgias, no rash, no rhinorrhea and no shortness of breath          REVIEW OF SYSTEMS     Review of Systems   Constitutional:  Negative for chills and fever. HENT:  Positive for sore throat. Negative for rhinorrhea. Eyes:  Negative for discharge and redness. Respiratory:  Positive for cough. Negative for shortness of breath. Cardiovascular:  Negative for chest pain. Gastrointestinal:  Negative for abdominal pain, diarrhea, nausea and vomiting. Genitourinary:  Negative for dysuria, frequency and urgency. Musculoskeletal:  Negative for arthralgias and myalgias. Skin:  Negative for rash. Neurological:  Negative for weakness and numbness. Psychiatric/Behavioral:  Negative for suicidal ideas. All other systems reviewed and are negative.   PASTMEDICAL HISTORY     Past Medical History:   Diagnosis Date    Abnormal Pap smear 2/12/08    Allergic rhinitis     Anxiety     meds in the past    BMI 50.0-59.9, adult ([facility], [place] [address]     Depression     Obesity     Ureteral stone with hydronephrosis 1/21/2016     Past Problem List  Patient Active Problem List   Diagnosis Code    H/O C/S x2 O34.219    Anxiety F41.9    History of postpartum depression Z87.59, Z86.59    BMI 50.0-59.9 Z68.43    Rh+/RI/GBSneg O09.90    Obesity (BMI 53.6) O99.210    History of macrosomia O09.299    RLTCS 1/27/13 F APG 8/9 wt 8#10 P97.335     SURGICAL HISTORY       Past Surgical History:   Procedure Laterality Date     SECTION  2007, 14    CYSTOSCOPY  18    DILATION AND CURETTAGE OF UTERUS      LITHOTRIPSY      OTHER SURGICAL HISTORY Left 16    ureteral stent placement     CURRENT MEDICATIONS       Discharge Medication List as of 11/15/2022  3:03 AM        CONTINUE these medications which have NOT CHANGED    Details   fluticasone (FLONASE) 50 MCG/ACT nasal spray 2 sprays by Nasal route daily, Disp-1 Bottle, R-0Normal      Prenatal Vit-DSS-Fe Fum-FA (PNV FE FUM/DOCUSATE/FOLIC ACID) 22-2 MG TABS Take 1 tablet by mouth daily, Disp-30 tablet, R-5Normal           ALLERGIES     has No Known Allergies. FAMILY HISTORY     She indicated that her mother is . She indicated that the status of her paternal grandmother is unknown. SOCIAL HISTORY       Social History     Tobacco Use    Smoking status: Former     Types: Cigarettes     Quit date: 2013     Years since quittin.8    Smokeless tobacco: Never   Vaping Use    Vaping Use: Never used   Substance Use Topics    Alcohol use: No     Comment: rarely    Drug use: No     PHYSICAL EXAM     INITIAL VITALS: /83   Pulse (!) 104   Temp 97.5 °F (36.4 °C) (Oral)   Resp 22   Ht 5' 5\" (1.651 m)   Wt (!) 383 lb (173.7 kg)   SpO2 97%   BMI 63.73 kg/m²    Physical Exam  Vitals and nursing note reviewed. Constitutional:       Appearance: Normal appearance. HENT:      Head: Normocephalic and atraumatic. Nose: Nose normal.      Mouth/Throat:      Mouth: Mucous membranes are moist.   Eyes:      Conjunctiva/sclera: Conjunctivae normal.      Pupils: Pupils are equal, round, and reactive to light. Cardiovascular:      Rate and Rhythm: Normal rate and regular rhythm. Pulses: Normal pulses. Heart sounds: Normal heart sounds. Pulmonary:      Effort: Pulmonary effort is normal.      Breath sounds: Normal breath sounds.    Abdominal: Palpations: Abdomen is soft. Tenderness: There is no abdominal tenderness. Musculoskeletal:         General: No swelling or deformity. Cervical back: Normal range of motion. Skin:     General: Skin is warm. Findings: No rash. Neurological:      General: No focal deficit present. Mental Status: She is alert and oriented to person, place, and time. Psychiatric:         Mood and Affect: Mood normal.       MEDICAL DECISION MAKINyear-old cough for 2 weeks differential pneumonia, bronchitis, asthma    Will obtain chest x-ray       CRITICAL CARE:       PROCEDURES:    Procedures    DIAGNOSTIC RESULTS   EKG:All EKG's are interpreted by the Emergency Department Physician who either signs or Co-signs this chart in the absence of a cardiologist.        RADIOLOGY:All plain film, CT, MRI, and formal ultrasound images (except ED bedside ultrasound) are read by the radiologist, see reports below, unless otherwisenoted in MDM or here. XR CHEST (2 VW)   Final Result   No acute process. LABS: All lab results were reviewed by myself, and all abnormals are listed below. Labs Reviewed - No data to display    EMERGENCY DEPARTMENTCOURSE:     Xray negative    Patient left before test resulted. Left before completing treatment and imaging. Vitals:    Vitals:    11/15/22 0008 11/15/22 0009   BP: 137/83    Pulse: (!) 104    Resp: 22    Temp: 97.5 °F (36.4 °C)    TempSrc: Oral    SpO2: 97%    Weight:  (!) 383 lb (173.7 kg)   Height:  5' 5\" (1.651 m)       The patient was given the following medications while in the emergency department:  Orders Placed This Encounter   Medications    dextromethorphan-guaiFENesin (ROBITUSSIN-DM)  MG/5ML liquid 10 mL     CONSULTS:  None    FINAL IMPRESSION      1. Chronic cough          DISPOSITION/PLAN   DISPOSITION Eloped - Left Before Treatment Complete 11/15/2022 02:51:18 AM      PATIENT REFERRED TO:  No follow-up provider specified.   DISCHARGE MEDICATIONS:  Discharge Medication List as of 11/15/2022  3:03 AM        The care is provided during an unprecedented national emergency due to the novel coronavirus, COVID 19.   MD Brittny Mart Si, Si, MD  11/15/22 [id number]

## 2023-06-07 NOTE — TELEPHONE ENCOUNTER
Pt called back and verified her appt today Low Dose Naltrexone Counseling- I discussed with the patient the potential risks and side effects of low dose naltrexone including but not limited to: more vivid dreams, headaches, nausea, vomiting, abdominal pain, fatigue, dizziness, and anxiety.